# Patient Record
Sex: FEMALE | Race: BLACK OR AFRICAN AMERICAN | NOT HISPANIC OR LATINO | URBAN - NONMETROPOLITAN AREA
[De-identification: names, ages, dates, MRNs, and addresses within clinical notes are randomized per-mention and may not be internally consistent; named-entity substitution may affect disease eponyms.]

---

## 2017-01-11 ENCOUNTER — OFFICE (OUTPATIENT)
Dept: URBAN - NONMETROPOLITAN AREA CLINIC 13 | Facility: CLINIC | Age: 62
End: 2017-01-11
Payer: MEDICAID

## 2017-01-11 VITALS
SYSTOLIC BLOOD PRESSURE: 147 MMHG | DIASTOLIC BLOOD PRESSURE: 99 MMHG | HEIGHT: 66 IN | HEART RATE: 80 BPM | WEIGHT: 162 LBS

## 2017-01-11 DIAGNOSIS — R10.32 LEFT LOWER QUADRANT PAIN: ICD-10-CM

## 2017-01-11 DIAGNOSIS — K59.09 OTHER CONSTIPATION: ICD-10-CM

## 2017-01-11 DIAGNOSIS — K21.9 GASTRO-ESOPHAGEAL REFLUX DISEASE WITHOUT ESOPHAGITIS: ICD-10-CM

## 2017-01-11 PROCEDURE — 99213 OFFICE O/P EST LOW 20 MIN: CPT

## 2017-01-11 RX ORDER — LINACLOTIDE 290 UG/1
CAPSULE, GELATIN COATED ORAL
Qty: 90 | Refills: 1 | Status: COMPLETED
Start: 2016-01-25 | End: 2023-04-17

## 2017-01-11 RX ORDER — POLYETHYLENE GLYCOL 3350 17 G/17G
POWDER, FOR SOLUTION ORAL
Qty: 1054 | Refills: 5 | Status: COMPLETED
Start: 2017-01-11 | End: 2023-08-17

## 2017-01-11 RX ORDER — ESOMEPRAZOLE MAGNESIUM 20 MG/1
CAPSULE, DELAYED RELEASE ORAL
Qty: 90 | Refills: 1 | Status: ACTIVE
Start: 2016-01-26

## 2017-02-24 ENCOUNTER — OFFICE (OUTPATIENT)
Dept: URBAN - NONMETROPOLITAN AREA CLINIC 13 | Facility: CLINIC | Age: 62
End: 2017-02-24
Payer: MEDICAID

## 2017-02-24 VITALS
HEART RATE: 66 BPM | WEIGHT: 156 LBS | DIASTOLIC BLOOD PRESSURE: 89 MMHG | SYSTOLIC BLOOD PRESSURE: 128 MMHG | HEIGHT: 66 IN

## 2017-02-24 DIAGNOSIS — R10.33 PERIUMBILICAL PAIN: ICD-10-CM

## 2017-02-24 DIAGNOSIS — K76.89 OTHER SPECIFIED DISEASES OF LIVER: ICD-10-CM

## 2017-02-24 DIAGNOSIS — K59.09 OTHER CONSTIPATION: ICD-10-CM

## 2017-02-24 DIAGNOSIS — K21.9 GASTRO-ESOPHAGEAL REFLUX DISEASE WITHOUT ESOPHAGITIS: ICD-10-CM

## 2017-02-24 PROCEDURE — 99213 OFFICE O/P EST LOW 20 MIN: CPT

## 2017-03-08 ENCOUNTER — OFFICE (OUTPATIENT)
Dept: URBAN - NONMETROPOLITAN AREA CLINIC 13 | Facility: CLINIC | Age: 62
End: 2017-03-08
Payer: MEDICAID

## 2017-03-08 VITALS — HEIGHT: 66 IN

## 2017-03-08 DIAGNOSIS — K76.89 OTHER SPECIFIED DISEASES OF LIVER: ICD-10-CM

## 2017-03-08 PROCEDURE — 76705 ECHO EXAM OF ABDOMEN: CPT | Mod: TC,26

## 2017-03-08 PROCEDURE — 76705 ECHO EXAM OF ABDOMEN: CPT | Mod: 26,TC

## 2017-05-24 ENCOUNTER — OFFICE (OUTPATIENT)
Dept: URBAN - NONMETROPOLITAN AREA CLINIC 13 | Facility: CLINIC | Age: 62
End: 2017-05-24
Payer: MEDICARE

## 2017-05-24 ENCOUNTER — OFFICE (OUTPATIENT)
Dept: URBAN - NONMETROPOLITAN AREA CLINIC 13 | Facility: CLINIC | Age: 62
End: 2017-05-24
Payer: MEDICAID

## 2017-05-24 VITALS
WEIGHT: 164 LBS | DIASTOLIC BLOOD PRESSURE: 80 MMHG | SYSTOLIC BLOOD PRESSURE: 145 MMHG | HEART RATE: 91 BPM | HEIGHT: 66 IN

## 2017-05-24 VITALS — HEIGHT: 66 IN

## 2017-05-24 DIAGNOSIS — K21.9 GASTRO-ESOPHAGEAL REFLUX DISEASE WITHOUT ESOPHAGITIS: ICD-10-CM

## 2017-05-24 DIAGNOSIS — K59.09 OTHER CONSTIPATION: ICD-10-CM

## 2017-05-24 DIAGNOSIS — Z79.899 OTHER LONG TERM (CURRENT) DRUG THERAPY: ICD-10-CM

## 2017-05-24 DIAGNOSIS — Z01.812 ENCOUNTER FOR PREPROCEDURAL LABORATORY EXAMINATION: ICD-10-CM

## 2017-05-24 LAB
COMPLETE METABOLIC: ALBUMIN: 4.4 G/DL (ref 3.5–5.2)
COMPLETE METABOLIC: ALK. PHOS: 86 U/L (ref 40–150)
COMPLETE METABOLIC: ALT: 27 U/L (ref 0–55)
COMPLETE METABOLIC: AST: 25 U/L (ref 5–34)
COMPLETE METABOLIC: BUN: 13 MG/DL (ref 7–26)
COMPLETE METABOLIC: CALCIUM: 9.6 MG/DL (ref 8.4–10.2)
COMPLETE METABOLIC: CHLORIDE: 107 MMOL/L (ref 98–107)
COMPLETE METABOLIC: CO2: 26 MEQ/L (ref 22–29)
COMPLETE METABOLIC: CREATININE: 0.9 MG/DL (ref 0.6–1.3)
COMPLETE METABOLIC: GLUCOSE: 82 MG/DL (ref 70–99)
COMPLETE METABOLIC: POTASSIUM: 3.6 MMOL/L (ref 3.5–5.3)
COMPLETE METABOLIC: SODIUM: 142 MMOL/L (ref 136–145)
COMPLETE METABOLIC: TOTAL BILIRUBIN: 0.5 MG/DL (ref 0.2–1.2)
COMPLETE METABOLIC: TOTAL PROTEIN: 7.1 G/DL (ref 6.4–8.3)

## 2017-05-24 PROCEDURE — 36415 COLL VENOUS BLD VENIPUNCTURE: CPT

## 2017-05-24 PROCEDURE — 80053 COMPREHEN METABOLIC PANEL: CPT

## 2017-05-24 PROCEDURE — 99213 OFFICE O/P EST LOW 20 MIN: CPT

## 2017-05-24 RX ORDER — ESOMEPRAZOLE MAGNESIUM 20 MG/1
CAPSULE, DELAYED RELEASE ORAL
Qty: 90 | Refills: 1 | Status: ACTIVE
Start: 2016-01-26

## 2017-05-24 RX ORDER — LINACLOTIDE 290 UG/1
CAPSULE, GELATIN COATED ORAL
Qty: 90 | Refills: 1 | Status: COMPLETED
Start: 2016-01-25 | End: 2023-04-17

## 2017-05-24 RX ORDER — POLYETHYLENE GLYCOL 3350 17 G/17G
POWDER, FOR SOLUTION ORAL
Qty: 1054 | Refills: 5 | Status: COMPLETED
Start: 2017-01-11 | End: 2023-08-17

## 2018-05-02 ENCOUNTER — OFFICE (OUTPATIENT)
Dept: URBAN - NONMETROPOLITAN AREA CLINIC 13 | Facility: CLINIC | Age: 63
End: 2018-05-02
Payer: MEDICAID

## 2018-05-02 ENCOUNTER — OFFICE (OUTPATIENT)
Dept: URBAN - NONMETROPOLITAN AREA CLINIC 13 | Facility: CLINIC | Age: 63
End: 2018-05-02
Payer: MEDICARE

## 2018-05-02 VITALS
HEART RATE: 79 BPM | DIASTOLIC BLOOD PRESSURE: 80 MMHG | WEIGHT: 160 LBS | SYSTOLIC BLOOD PRESSURE: 119 MMHG | HEIGHT: 66 IN

## 2018-05-02 VITALS — HEIGHT: 66 IN

## 2018-05-02 DIAGNOSIS — K21.9 GASTRO-ESOPHAGEAL REFLUX DISEASE WITHOUT ESOPHAGITIS: ICD-10-CM

## 2018-05-02 DIAGNOSIS — Z79.899 OTHER LONG TERM (CURRENT) DRUG THERAPY: ICD-10-CM

## 2018-05-02 DIAGNOSIS — K59.00 CONSTIPATION, UNSPECIFIED: ICD-10-CM

## 2018-05-02 LAB
CBC SYSMEX: HEMATOCRIT: 36.5 % — LOW (ref 37–47)
CBC SYSMEX: HEMOGLOBIN: 12.1 G/DL (ref 12–14)
CBC SYSMEX: LYMPHS %: 50.5 % — HIGH (ref 20.5–40.5)
CBC SYSMEX: LYMPHS ABSOLUTE: 1.9 10*3U/L (ref 1.3–2.9)
CBC SYSMEX: MCH: 32.2 PG — HIGH (ref 27–32)
CBC SYSMEX: MCHC: 33.2 G/DL (ref 28.5–35)
CBC SYSMEX: MCV: 97.1 FL (ref 84–100)
CBC SYSMEX: MONOS %: 6.7 % (ref 2–10)
CBC SYSMEX: MONOS ABSOLUTE: 0.2 10*3U/L — LOW (ref 0.3–3.8)
CBC SYSMEX: MPV: 9.8 FL (ref 7.4–10.4)
CBC SYSMEX: NEUT. %: 42.8 % — LOW (ref 43–65)
CBC SYSMEX: NEUT. ABSOLUTE: 1.6 10*3U/L — LOW (ref 2.2–4.8)
CBC SYSMEX: PLATELETS: 206 10*3U/L (ref 130–440)
CBC SYSMEX: RBC: 3.8 10*6U/L — LOW (ref 4.2–5.4)
CBC SYSMEX: RDW: 12.8 % (ref 11.5–15.5)
CBC SYSMEX: WBC: 3.7 10*3U/L — LOW (ref 4.5–10.5)
COMPLETE METABOLIC: ALBUMIN: 4.5 G/DL (ref 3.5–5.2)
COMPLETE METABOLIC: ALK. PHOS: 103 U/L (ref 40–150)
COMPLETE METABOLIC: ALT: 22 U/L (ref 0–55)
COMPLETE METABOLIC: AST: 22 U/L (ref 5–34)
COMPLETE METABOLIC: BUN: 16 MG/DL (ref 7–26)
COMPLETE METABOLIC: CALCIUM: 9.7 MG/DL (ref 8.4–10.2)
COMPLETE METABOLIC: CHLORIDE: 107 MMOL/L (ref 98–107)
COMPLETE METABOLIC: CO2: 27 MEQ/L (ref 22–29)
COMPLETE METABOLIC: CREATININE: 0.91 MG/DL (ref 0.57–1.25)
COMPLETE METABOLIC: GFR - AFRICAN AMERICAN: 80.2 (ref 59–200)
COMPLETE METABOLIC: GFR - NON AFRICAN AMERICAN: 66.1 (ref 59–200)
COMPLETE METABOLIC: GLUCOSE: 95 MG/DL (ref 70–99)
COMPLETE METABOLIC: POTASSIUM: 3.7 MMOL/L (ref 3.5–5.3)
COMPLETE METABOLIC: SODIUM: 137 MMOL/L (ref 136–145)
COMPLETE METABOLIC: TOTAL BILIRUBIN: 0.5 MG/DL (ref 0.2–1.2)
COMPLETE METABOLIC: TOTAL PROTEIN: 6.9 G/DL (ref 6.4–8.3)

## 2018-05-02 PROCEDURE — 99213 OFFICE O/P EST LOW 20 MIN: CPT | Performed by: NURSE PRACTITIONER

## 2018-05-02 PROCEDURE — 80053 COMPREHEN METABOLIC PANEL: CPT | Performed by: NURSE PRACTITIONER

## 2018-05-02 PROCEDURE — 36415 COLL VENOUS BLD VENIPUNCTURE: CPT | Performed by: NURSE PRACTITIONER

## 2018-05-02 PROCEDURE — 85025 COMPLETE CBC W/AUTO DIFF WBC: CPT | Performed by: NURSE PRACTITIONER

## 2018-05-02 RX ORDER — POLYETHYLENE GLYCOL 3350 17 G/17G
POWDER, FOR SOLUTION ORAL
Qty: 1054 | Refills: 5 | Status: COMPLETED
Start: 2017-01-11 | End: 2023-08-17

## 2018-05-02 RX ORDER — LINACLOTIDE 290 UG/1
CAPSULE, GELATIN COATED ORAL
Qty: 90 | Refills: 1 | Status: COMPLETED
Start: 2016-01-25 | End: 2023-04-17

## 2018-05-02 RX ORDER — ESOMEPRAZOLE MAGNESIUM 20 MG/1
CAPSULE, DELAYED RELEASE ORAL
Qty: 90 | Refills: 1 | Status: ACTIVE
Start: 2016-01-26

## 2018-05-07 ENCOUNTER — OFFICE (OUTPATIENT)
Dept: URBAN - NONMETROPOLITAN AREA CLINIC 13 | Facility: CLINIC | Age: 63
End: 2018-05-07
Payer: MEDICARE

## 2018-05-07 VITALS — HEIGHT: 66 IN

## 2018-05-07 DIAGNOSIS — K59.00 CONSTIPATION, UNSPECIFIED: ICD-10-CM

## 2018-05-07 PROCEDURE — 82274 ASSAY TEST FOR BLOOD FECAL: CPT | Performed by: NURSE PRACTITIONER

## 2018-05-09 ENCOUNTER — OFFICE (OUTPATIENT)
Dept: URBAN - NONMETROPOLITAN AREA CLINIC 13 | Facility: CLINIC | Age: 63
End: 2018-05-09
Payer: MEDICARE

## 2018-05-09 VITALS — HEIGHT: 66 IN

## 2018-05-09 DIAGNOSIS — D72.819 DECREASED WHITE BLOOD CELL COUNT, UNSPECIFIED: ICD-10-CM

## 2018-05-09 PROCEDURE — 85025 COMPLETE CBC W/AUTO DIFF WBC: CPT | Performed by: NURSE PRACTITIONER

## 2018-05-09 PROCEDURE — 36415 COLL VENOUS BLD VENIPUNCTURE: CPT | Performed by: NURSE PRACTITIONER

## 2022-10-25 ENCOUNTER — OFFICE (OUTPATIENT)
Dept: URBAN - NONMETROPOLITAN AREA CLINIC 13 | Facility: CLINIC | Age: 67
End: 2022-10-25
Payer: COMMERCIAL

## 2022-10-25 ENCOUNTER — OFFICE (OUTPATIENT)
Dept: URBAN - NONMETROPOLITAN AREA CLINIC 13 | Facility: CLINIC | Age: 67
End: 2022-10-25

## 2022-10-25 VITALS
HEART RATE: 86 BPM | OXYGEN SATURATION: 96 % | HEIGHT: 66 IN | SYSTOLIC BLOOD PRESSURE: 153 MMHG | DIASTOLIC BLOOD PRESSURE: 95 MMHG | DIASTOLIC BLOOD PRESSURE: 79 MMHG | SYSTOLIC BLOOD PRESSURE: 127 MMHG | WEIGHT: 151 LBS

## 2022-10-25 VITALS — HEIGHT: 66 IN

## 2022-10-25 DIAGNOSIS — R10.2 PELVIC AND PERINEAL PAIN: ICD-10-CM

## 2022-10-25 DIAGNOSIS — R10.33 PERIUMBILICAL PAIN: ICD-10-CM

## 2022-10-25 DIAGNOSIS — Z86.010 PERSONAL HISTORY OF COLONIC POLYPS: ICD-10-CM

## 2022-10-25 DIAGNOSIS — K59.00 CONSTIPATION, UNSPECIFIED: ICD-10-CM

## 2022-10-25 LAB
AMYLASE: 77 UNITS/L (ref 30–110)
CBC WITH WBC (DIFF): ACANTHOCYTE: NORMAL
CBC WITH WBC (DIFF): AGRANULAR NEUTROPHIL: NORMAL
CBC WITH WBC (DIFF): ANISOCYTOSIS: NORMAL
CBC WITH WBC (DIFF): ATYPICAL LYMPHOCYTE: NORMAL
CBC WITH WBC (DIFF): AUER RODS: NORMAL
CBC WITH WBC (DIFF): BAND: NORMAL
CBC WITH WBC (DIFF): BASOPHIL: 0 % (ref 0–1)
CBC WITH WBC (DIFF): BASOPHILIC STIPPLING: NORMAL
CBC WITH WBC (DIFF): BI-LOBED NEUTROPHIL: NORMAL
CBC WITH WBC (DIFF): BLAST: NORMAL
CBC WITH WBC (DIFF): BURR CELL: NORMAL
CBC WITH WBC (DIFF): DIMORPHIC RBC POPULATION: NORMAL
CBC WITH WBC (DIFF): DOHLE BODIES: NORMAL
CBC WITH WBC (DIFF): ELLIPTOCYTE: NORMAL
CBC WITH WBC (DIFF): EOSINOPHIL: 2 % (ref 0–5)
CBC WITH WBC (DIFF): GIANT PLATELET: NORMAL
CBC WITH WBC (DIFF): HEMATOCRIT: 37.9 % (ref 36–46)
CBC WITH WBC (DIFF): HEMOGLOBIN: 12.1 G/DL (ref 12–16)
CBC WITH WBC (DIFF): HOWELL JOLLY BODIES: NORMAL
CBC WITH WBC (DIFF): HYPERSEGMENTED NEUTROPHIL: NORMAL
CBC WITH WBC (DIFF): HYPOCHROMIA: NORMAL
CBC WITH WBC (DIFF): HYPOGRANULAR NEUTROPHIL: NORMAL
CBC WITH WBC (DIFF): IMMATURE GRANULOCYTES: 0 % (ref 0–1)
CBC WITH WBC (DIFF): LARGE PLATELET: NORMAL
CBC WITH WBC (DIFF): LYMPHOCYTE: 48 % — HIGH (ref 20–40)
CBC WITH WBC (DIFF): MACROCYTOSIS: NORMAL
CBC WITH WBC (DIFF): MEAN CELL HEMOGLOBIN CONCENTRATION: 31.9 G/DL — LOW (ref 33–37)
CBC WITH WBC (DIFF): MEAN CELL HEMOGLOBIN: 31.4 PG — HIGH (ref 27–31)
CBC WITH WBC (DIFF): MEAN CELL VOLUME: 98 FL (ref 84–100)
CBC WITH WBC (DIFF): MEAN PLATELET VOLUME: 10.4 FL (ref 8.3–11.9)
CBC WITH WBC (DIFF): METAMYELOCYTE: NORMAL
CBC WITH WBC (DIFF): MICROCYTOSIS: NORMAL
CBC WITH WBC (DIFF): MIX CELLS: NORMAL
CBC WITH WBC (DIFF): MONOCYTE: 7 % (ref 1–10)
CBC WITH WBC (DIFF): MYELOCYTE: NORMAL
CBC WITH WBC (DIFF): NEUTROPHIL SEGMENTED: 42 % — LOW (ref 50–70)
CBC WITH WBC (DIFF): NOTE: NORMAL
CBC WITH WBC (DIFF): NUCLEATED RBC: 0 /100WBC (ref 0–0)
CBC WITH WBC (DIFF): OVALOCYTE: NORMAL
CBC WITH WBC (DIFF): PAPPENHEIMER BODIES: NORMAL
CBC WITH WBC (DIFF): PATHOLOGIST INTERPRETATION: NORMAL
CBC WITH WBC (DIFF): PLATELET CLUMPS: NORMAL
CBC WITH WBC (DIFF): PLATELET COUNT: 202 /NL (ref 140–440)
CBC WITH WBC (DIFF): PLT SATELLITOSIS: NORMAL
CBC WITH WBC (DIFF): POIKILOCYTOSIS: NORMAL
CBC WITH WBC (DIFF): POLYCHROMASIA: NORMAL
CBC WITH WBC (DIFF): PROMYELOCYTE: NORMAL
CBC WITH WBC (DIFF): RBC MORPHOLOGY: NORMAL
CBC WITH WBC (DIFF): REACT LYMPH ABS MAN: NORMAL
CBC WITH WBC (DIFF): REACTIVE LYMPHOCYTE: NORMAL
CBC WITH WBC (DIFF): RED BLOOD CELL: 3.85 /PL — LOW (ref 4.2–5.4)
CBC WITH WBC (DIFF): RED CELL DIAMETER WIDTH: 49 FL — HIGH (ref 35–48)
CBC WITH WBC (DIFF): ROULEAUX RBC: NORMAL
CBC WITH WBC (DIFF): SCHISTOCYTE: NORMAL
CBC WITH WBC (DIFF): SICKLE CELL: NORMAL
CBC WITH WBC (DIFF): SMUDGE CELLS: NORMAL
CBC WITH WBC (DIFF): SPHEROCYTE: NORMAL
CBC WITH WBC (DIFF): STOMATOCYTE: NORMAL
CBC WITH WBC (DIFF): TARGET CELL: NORMAL
CBC WITH WBC (DIFF): TEAR DROP CELL: NORMAL
CBC WITH WBC (DIFF): TOXIC GRANULATION: NORMAL
CBC WITH WBC (DIFF): UNCLASSIFIED CELL: NORMAL
CBC WITH WBC (DIFF): VACUOLATED NEUTROPHIL: NORMAL
CBC WITH WBC (DIFF): WBC MORPHOLOGY: NORMAL
CBC WITH WBC (DIFF): WHITE BLOOD CELL: 3 /NL — LOW (ref 4.8–11)
COMPREHENSIVE METABOLIC PANEL: ALBUMIN: 4.1 G/DL (ref 3.5–4.8)
COMPREHENSIVE METABOLIC PANEL: ALKALINE PHOSPHATASE: 82 UNITS/L (ref 36–126)
COMPREHENSIVE METABOLIC PANEL: ALT: 26 UNITS/L (ref ?–34)
COMPREHENSIVE METABOLIC PANEL: ANION GAP: 7 MMOL/L (ref 7–16)
COMPREHENSIVE METABOLIC PANEL: AST: 29 UNITS/L (ref 14–36)
COMPREHENSIVE METABOLIC PANEL: BILIRUBIN, TOTAL: 0.5 MG/DL (ref 0.2–1.3)
COMPREHENSIVE METABOLIC PANEL: BUN/CREATININE RATIO: 15.2
COMPREHENSIVE METABOLIC PANEL: CALCIUM: 9.4 MG/DL (ref 8.4–10.2)
COMPREHENSIVE METABOLIC PANEL: CARBON DIOXIDE: 26 MMOL/L (ref 22–30)
COMPREHENSIVE METABOLIC PANEL: CHLORIDE: 104 MMOL/L (ref 98–107)
COMPREHENSIVE METABOLIC PANEL: CREATININE: 0.79 MG/DL (ref 0.52–1.04)
COMPREHENSIVE METABOLIC PANEL: GLUCOSE: 85 MG/DL (ref 65–105)
COMPREHENSIVE METABOLIC PANEL: POTASSIUM: 3.8 MMOL/L (ref 3.5–5.3)
COMPREHENSIVE METABOLIC PANEL: PROTEIN, TOTAL, SERUM: 6.2 G/DL — LOW (ref 6.3–8.2)
COMPREHENSIVE METABOLIC PANEL: SODIUM: 137 MMOL/L (ref 137–145)
COMPREHENSIVE METABOLIC PANEL: UREA NITROGEN (BUN): 12 MG/DL (ref 7–17)
GFR: EGFR AFRICAN AMERICAN: >60 ML/MIN/1.73M2
GFR: EGFR NON-AFR.AMERICAN: >60 ML/MIN/1.73M2
LIPASE: 46 UNITS/L (ref 23–300)

## 2022-10-25 PROCEDURE — 99204 OFFICE O/P NEW MOD 45 MIN: CPT | Mod: 25 | Performed by: NURSE PRACTITIONER

## 2022-10-25 PROCEDURE — 76700 US EXAM ABDOM COMPLETE: CPT | Mod: TC | Performed by: NURSE PRACTITIONER

## 2022-10-25 PROCEDURE — 76856 US EXAM PELVIC COMPLETE: CPT | Mod: TC | Performed by: NURSE PRACTITIONER

## 2022-10-25 RX ORDER — POLYETHYLENE GLYCOL 3350 17 G/17G
POWDER, FOR SOLUTION ORAL
Qty: 1054 | Refills: 5 | Status: COMPLETED
Start: 2017-01-11 | End: 2023-08-17

## 2022-11-04 ENCOUNTER — OFFICE (OUTPATIENT)
Dept: URBAN - NONMETROPOLITAN AREA CLINIC 13 | Facility: CLINIC | Age: 67
End: 2022-11-04
Payer: MEDICAID

## 2022-11-04 VITALS — HEIGHT: 66 IN

## 2022-11-04 DIAGNOSIS — R19.01 RIGHT UPPER QUADRANT ABDOMINAL SWELLING, MASS AND LUMP: ICD-10-CM

## 2022-11-04 PROCEDURE — 74160 CT ABDOMEN W/CONTRAST: CPT | Mod: TC | Performed by: NURSE PRACTITIONER

## 2023-04-04 ENCOUNTER — OFFICE (OUTPATIENT)
Dept: URBAN - NONMETROPOLITAN AREA CLINIC 13 | Facility: CLINIC | Age: 68
End: 2023-04-04
Payer: MEDICAID

## 2023-04-04 VITALS
SYSTOLIC BLOOD PRESSURE: 134 MMHG | OXYGEN SATURATION: 97 % | DIASTOLIC BLOOD PRESSURE: 87 MMHG | WEIGHT: 148 LBS | HEART RATE: 66 BPM | HEIGHT: 66 IN

## 2023-04-04 DIAGNOSIS — K59.00 CONSTIPATION, UNSPECIFIED: ICD-10-CM

## 2023-04-04 DIAGNOSIS — Z86.010 PERSONAL HISTORY OF COLONIC POLYPS: ICD-10-CM

## 2023-04-04 LAB
CBC WITH WBC (DIFF): ACANTHOCYTE: NORMAL
CBC WITH WBC (DIFF): AGRANULAR NEUTROPHIL: NORMAL
CBC WITH WBC (DIFF): ANISOCYTOSIS: NORMAL
CBC WITH WBC (DIFF): ATYPICAL LYMPHOCYTE: NORMAL
CBC WITH WBC (DIFF): AUER RODS: NORMAL
CBC WITH WBC (DIFF): BAND: NORMAL
CBC WITH WBC (DIFF): BASOPHIL: 1 % (ref 0–1)
CBC WITH WBC (DIFF): BASOPHILIC STIPPLING: NORMAL
CBC WITH WBC (DIFF): BI-LOBED NEUTROPHIL: NORMAL
CBC WITH WBC (DIFF): BLAST: NORMAL
CBC WITH WBC (DIFF): BURR CELL: NORMAL
CBC WITH WBC (DIFF): DIMORPHIC RBC POPULATION: NORMAL
CBC WITH WBC (DIFF): DOHLE BODIES: NORMAL
CBC WITH WBC (DIFF): ELLIPTOCYTE: NORMAL
CBC WITH WBC (DIFF): EOSINOPHIL: 2 % (ref 0–5)
CBC WITH WBC (DIFF): GIANT PLATELET: NORMAL
CBC WITH WBC (DIFF): HEMATOCRIT: 39 % (ref 36–46)
CBC WITH WBC (DIFF): HEMOGLOBIN: 12.7 G/DL (ref 12–16)
CBC WITH WBC (DIFF): HOWELL JOLLY BODIES: NORMAL
CBC WITH WBC (DIFF): HYPERSEGMENTED NEUTROPHIL: NORMAL
CBC WITH WBC (DIFF): HYPOCHROMIA: NORMAL
CBC WITH WBC (DIFF): HYPOGRANULAR NEUTROPHIL: NORMAL
CBC WITH WBC (DIFF): IMMATURE GRANULOCYTES: 0 % (ref 0–1)
CBC WITH WBC (DIFF): LARGE PLATELET: NORMAL
CBC WITH WBC (DIFF): LYMPHOCYTE: 55 % — HIGH (ref 20–40)
CBC WITH WBC (DIFF): MACROCYTOSIS: NORMAL
CBC WITH WBC (DIFF): MEAN CELL HEMOGLOBIN CONCENTRATION: 32.6 G/DL — LOW (ref 33–37)
CBC WITH WBC (DIFF): MEAN CELL HEMOGLOBIN: 32.1 PG — HIGH (ref 27–31)
CBC WITH WBC (DIFF): MEAN CELL VOLUME: 99 FL (ref 84–100)
CBC WITH WBC (DIFF): MEAN PLATELET VOLUME: 10.6 FL (ref 8.3–11.9)
CBC WITH WBC (DIFF): METAMYELOCYTE: NORMAL
CBC WITH WBC (DIFF): MICROCYTOSIS: NORMAL
CBC WITH WBC (DIFF): MIX CELLS: NORMAL
CBC WITH WBC (DIFF): MONOCYTE: 10 % (ref 1–10)
CBC WITH WBC (DIFF): MYELOCYTE: NORMAL
CBC WITH WBC (DIFF): NEUTROPHIL SEGMENTED: 32 % — LOW (ref 50–70)
CBC WITH WBC (DIFF): NOTE: NORMAL
CBC WITH WBC (DIFF): NUCLEATED RBC: 0 /100WBC (ref 0–0)
CBC WITH WBC (DIFF): OVALOCYTE: NORMAL
CBC WITH WBC (DIFF): PAPPENHEIMER BODIES: NORMAL
CBC WITH WBC (DIFF): PATHOLOGIST INTERPRETATION: NORMAL
CBC WITH WBC (DIFF): PLATELET CLUMPS: NORMAL
CBC WITH WBC (DIFF): PLATELET COUNT: 207 /NL (ref 140–440)
CBC WITH WBC (DIFF): PLT SATELLITOSIS: NORMAL
CBC WITH WBC (DIFF): POIKILOCYTOSIS: NORMAL
CBC WITH WBC (DIFF): POLYCHROMASIA: NORMAL
CBC WITH WBC (DIFF): PROMYELOCYTE: NORMAL
CBC WITH WBC (DIFF): RBC MORPHOLOGY: NORMAL
CBC WITH WBC (DIFF): REACT LYMPH ABS MAN: NORMAL
CBC WITH WBC (DIFF): REACTIVE LYMPHOCYTE: NORMAL
CBC WITH WBC (DIFF): RED BLOOD CELL: 3.96 /PL — LOW (ref 4.2–5.4)
CBC WITH WBC (DIFF): RED CELL DIAMETER WIDTH: 49 FL — HIGH (ref 35–48)
CBC WITH WBC (DIFF): ROULEAUX RBC: NORMAL
CBC WITH WBC (DIFF): SCHISTOCYTE: NORMAL
CBC WITH WBC (DIFF): SICKLE CELL: NORMAL
CBC WITH WBC (DIFF): SMUDGE CELLS: NORMAL
CBC WITH WBC (DIFF): SPHEROCYTE: NORMAL
CBC WITH WBC (DIFF): STOMATOCYTE: NORMAL
CBC WITH WBC (DIFF): TARGET CELL: NORMAL
CBC WITH WBC (DIFF): TEAR DROP CELL: NORMAL
CBC WITH WBC (DIFF): TOXIC GRANULATION: NORMAL
CBC WITH WBC (DIFF): UNCLASSIFIED CELL: NORMAL
CBC WITH WBC (DIFF): VACUOLATED NEUTROPHIL: NORMAL
CBC WITH WBC (DIFF): WBC MORPHOLOGY: NORMAL
CBC WITH WBC (DIFF): WHITE BLOOD CELL: 3 /NL — LOW (ref 4.8–11)
COMPREHENSIVE METABOLIC PANEL: ALBUMIN: 4.3 G/DL (ref 3.5–4.8)
COMPREHENSIVE METABOLIC PANEL: ALKALINE PHOSPHATASE: 70 UNITS/L (ref 36–126)
COMPREHENSIVE METABOLIC PANEL: ALT: 28 UNITS/L (ref ?–34)
COMPREHENSIVE METABOLIC PANEL: ANION GAP: 10 MMOL/L (ref 7–16)
COMPREHENSIVE METABOLIC PANEL: AST: 32 UNITS/L (ref 14–36)
COMPREHENSIVE METABOLIC PANEL: BILIRUBIN, TOTAL: 0.5 MG/DL (ref 0.2–1.3)
COMPREHENSIVE METABOLIC PANEL: BUN/CREATININE RATIO: 16.7
COMPREHENSIVE METABOLIC PANEL: CALCIUM: 9.3 MG/DL (ref 8.4–10.2)
COMPREHENSIVE METABOLIC PANEL: CARBON DIOXIDE: 27 MMOL/L (ref 22–30)
COMPREHENSIVE METABOLIC PANEL: CHLORIDE: 102 MMOL/L (ref 98–107)
COMPREHENSIVE METABOLIC PANEL: CREATININE: 0.78 MG/DL (ref 0.52–1.04)
COMPREHENSIVE METABOLIC PANEL: GLUCOSE: 88 MG/DL (ref 65–105)
COMPREHENSIVE METABOLIC PANEL: POTASSIUM: 4 MMOL/L (ref 3.5–5.3)
COMPREHENSIVE METABOLIC PANEL: PROTEIN, TOTAL, SERUM: 6.6 G/DL (ref 6.3–8.2)
COMPREHENSIVE METABOLIC PANEL: SODIUM: 139 MMOL/L (ref 137–145)
COMPREHENSIVE METABOLIC PANEL: UREA NITROGEN (BUN): 13 MG/DL (ref 7–17)
GFR: EGFR AFRICAN AMERICAN: >60 ML/MIN/1.73M2
GFR: EGFR NON-AFR.AMERICAN: >60 ML/MIN/1.73M2

## 2023-04-04 PROCEDURE — 99213 OFFICE O/P EST LOW 20 MIN: CPT | Performed by: NURSE PRACTITIONER

## 2023-04-17 ENCOUNTER — AMBULATORY SURGICAL CENTER (OUTPATIENT)
Dept: URBAN - NONMETROPOLITAN AREA SURGERY 2 | Facility: SURGERY | Age: 68
End: 2023-04-17
Payer: MEDICARE

## 2023-04-17 VITALS
SYSTOLIC BLOOD PRESSURE: 121 MMHG | HEART RATE: 77 BPM | DIASTOLIC BLOOD PRESSURE: 76 MMHG | RESPIRATION RATE: 18 BRPM | WEIGHT: 148 LBS | OXYGEN SATURATION: 100 % | DIASTOLIC BLOOD PRESSURE: 90 MMHG | SYSTOLIC BLOOD PRESSURE: 137 MMHG | DIASTOLIC BLOOD PRESSURE: 61 MMHG | DIASTOLIC BLOOD PRESSURE: 67 MMHG | HEART RATE: 81 BPM | RESPIRATION RATE: 12 BRPM | RESPIRATION RATE: 14 BRPM | HEART RATE: 75 BPM | HEART RATE: 78 BPM | OXYGEN SATURATION: 97 % | HEART RATE: 79 BPM | DIASTOLIC BLOOD PRESSURE: 77 MMHG | HEART RATE: 80 BPM | SYSTOLIC BLOOD PRESSURE: 122 MMHG | DIASTOLIC BLOOD PRESSURE: 70 MMHG | HEART RATE: 76 BPM | SYSTOLIC BLOOD PRESSURE: 120 MMHG | SYSTOLIC BLOOD PRESSURE: 110 MMHG | HEART RATE: 87 BPM | RESPIRATION RATE: 20 BRPM | SYSTOLIC BLOOD PRESSURE: 118 MMHG | OXYGEN SATURATION: 98 % | HEART RATE: 73 BPM | DIASTOLIC BLOOD PRESSURE: 92 MMHG | SYSTOLIC BLOOD PRESSURE: 123 MMHG | TEMPERATURE: 97.5 F | RESPIRATION RATE: 11 BRPM | SYSTOLIC BLOOD PRESSURE: 105 MMHG | HEIGHT: 66 IN | RESPIRATION RATE: 19 BRPM | RESPIRATION RATE: 9 BRPM | SYSTOLIC BLOOD PRESSURE: 111 MMHG | SYSTOLIC BLOOD PRESSURE: 103 MMHG | SYSTOLIC BLOOD PRESSURE: 104 MMHG | SYSTOLIC BLOOD PRESSURE: 153 MMHG | DIASTOLIC BLOOD PRESSURE: 78 MMHG | DIASTOLIC BLOOD PRESSURE: 83 MMHG | OXYGEN SATURATION: 96 % | HEART RATE: 85 BPM | HEART RATE: 74 BPM | DIASTOLIC BLOOD PRESSURE: 85 MMHG | DIASTOLIC BLOOD PRESSURE: 69 MMHG | DIASTOLIC BLOOD PRESSURE: 71 MMHG

## 2023-04-17 DIAGNOSIS — Z86.010 PERSONAL HISTORY OF COLONIC POLYPS: ICD-10-CM

## 2023-04-17 PROCEDURE — G0105 COLORECTAL SCRN; HI RISK IND: HCPCS | Mod: 22 | Performed by: INTERNAL MEDICINE

## 2023-04-17 RX ORDER — POLYETHYLENE GLYCOL 3350 17 G/17G
POWDER, FOR SOLUTION ORAL
Qty: 1054 | Refills: 5 | Status: COMPLETED
Start: 2017-01-11 | End: 2023-08-17

## 2023-04-17 RX ORDER — LINACLOTIDE 290 UG/1
CAPSULE, GELATIN COATED ORAL
Qty: 90 | Refills: 2 | Status: ACTIVE
Start: 2023-04-17

## 2023-08-17 ENCOUNTER — OFFICE (OUTPATIENT)
Dept: URBAN - NONMETROPOLITAN AREA CLINIC 13 | Facility: CLINIC | Age: 68
End: 2023-08-17
Payer: MEDICARE

## 2023-08-17 VITALS
SYSTOLIC BLOOD PRESSURE: 128 MMHG | HEIGHT: 66 IN | HEART RATE: 77 BPM | DIASTOLIC BLOOD PRESSURE: 77 MMHG | OXYGEN SATURATION: 98 % | WEIGHT: 152 LBS

## 2023-08-17 DIAGNOSIS — K59.00 CONSTIPATION, UNSPECIFIED: ICD-10-CM

## 2023-08-17 DIAGNOSIS — K21.9 GASTRO-ESOPHAGEAL REFLUX DISEASE WITHOUT ESOPHAGITIS: ICD-10-CM

## 2023-08-17 PROCEDURE — 99213 OFFICE O/P EST LOW 20 MIN: CPT | Performed by: NURSE PRACTITIONER

## 2023-08-17 RX ORDER — LINACLOTIDE 290 UG/1
CAPSULE, GELATIN COATED ORAL
Qty: 90 | Refills: 2 | Status: ACTIVE
Start: 2023-04-17

## 2024-05-25 NOTE — SERVICEHPINOTES
Pt c/o left leg numbness and tingling and dizziness starting last night at 9 pm.      See previous HPI.